# Patient Record
Sex: FEMALE | Race: WHITE | NOT HISPANIC OR LATINO | Employment: FULL TIME | URBAN - METROPOLITAN AREA
[De-identification: names, ages, dates, MRNs, and addresses within clinical notes are randomized per-mention and may not be internally consistent; named-entity substitution may affect disease eponyms.]

---

## 2024-07-07 ENCOUNTER — APPOINTMENT (OUTPATIENT)
Dept: GENERAL RADIOLOGY | Facility: CLINIC | Age: 19
End: 2024-07-07
Attending: STUDENT IN AN ORGANIZED HEALTH CARE EDUCATION/TRAINING PROGRAM

## 2024-07-07 ENCOUNTER — HOSPITAL ENCOUNTER (EMERGENCY)
Facility: CLINIC | Age: 19
Discharge: HOME | End: 2024-07-07
Attending: PHYSICIAN ASSISTANT | Admitting: PHYSICIAN ASSISTANT

## 2024-07-07 VITALS
HEART RATE: 120 BPM | DIASTOLIC BLOOD PRESSURE: 59 MMHG | BODY MASS INDEX: 31.35 KG/M2 | OXYGEN SATURATION: 96 % | HEIGHT: 64 IN | RESPIRATION RATE: 16 BRPM | WEIGHT: 183.64 LBS | SYSTOLIC BLOOD PRESSURE: 117 MMHG | TEMPERATURE: 98.3 F

## 2024-07-07 DIAGNOSIS — S60.221A CONTUSION OF RIGHT HAND, INITIAL ENCOUNTER: ICD-10-CM

## 2024-07-07 DIAGNOSIS — S60.511A ABRASION OF RIGHT HAND, INITIAL ENCOUNTER: ICD-10-CM

## 2024-07-07 PROCEDURE — 73130 X-RAY EXAM OF HAND: CPT | Mod: RT

## 2024-07-07 PROCEDURE — 99283 EMERGENCY DEPT VISIT LOW MDM: CPT

## 2024-07-07 ASSESSMENT — COLUMBIA-SUICIDE SEVERITY RATING SCALE - C-SSRS
4. HAVE YOU HAD THESE THOUGHTS AND HAD SOME INTENTION OF ACTING ON THEM?: NO
2. HAVE YOU ACTUALLY HAD ANY THOUGHTS OF KILLING YOURSELF IN THE PAST MONTH?: YES
6. HAVE YOU EVER DONE ANYTHING, STARTED TO DO ANYTHING, OR PREPARED TO DO ANYTHING TO END YOUR LIFE?: NO
5. HAVE YOU STARTED TO WORK OUT OR WORKED OUT THE DETAILS OF HOW TO KILL YOURSELF? DO YOU INTEND TO CARRY OUT THIS PLAN?: NO
1. IN THE PAST MONTH, HAVE YOU WISHED YOU WERE DEAD OR WISHED YOU COULD GO TO SLEEP AND NOT WAKE UP?: YES
3. HAVE YOU BEEN THINKING ABOUT HOW YOU MIGHT KILL YOURSELF?: NO

## 2024-07-07 ASSESSMENT — ACTIVITIES OF DAILY LIVING (ADL)
ADLS_ACUITY_SCORE: 33
ADLS_ACUITY_SCORE: 35

## 2024-07-07 NOTE — Clinical Note
Trinidad Winkler was seen and treated in our emergency department on 7/7/2024.  She may return to work on 07/10/2024.  Please excuse her from work due to injury.  She may return when improved.     If you have any questions or concerns, please don't hesitate to call.      Joseph Mcgee PA-C

## 2024-07-08 NOTE — ED PROVIDER NOTES
"  Emergency Department Note      History of Present Illness     Chief Complaint   Hand Injury      HPI   Trinidad Winkler is a 19 year old female who presents with right hand injury.  The patient punched a mirror with her right hand out of anger earlier.  Reports some bruising and discomfort to the fifth metacarpal area as well as superficial cuts from the glass.  She has not cleaned it.  Denies any thoughts of hurting herself or others right now.  No numbness or difficulty moving fingers.  Tetanus 2016.    Independent Historian   None    Review of External Notes   none    Past Medical History     Medical History and Problem List   No past medical history on file.    Medications   No current outpatient medications on file.      Surgical History   No past surgical history on file.    Physical Exam     Patient Vitals for the past 24 hrs:   BP Temp Temp src Pulse Resp SpO2 Height Weight   07/07/24 1950 117/59 98.3  F (36.8  C) Temporal 120 16 96 % 1.626 m (5' 4\") 83.3 kg (183 lb 10.3 oz)     Physical Exam  General: Alert and oriented.    Head: Normocephalic.  External ears and nose normal.    Eyes: Pupils equal and round.  Normal tracking.    Pulmonary/Chest: Effort and rate normal    MSK:  Slight bruise and ttp to dorsal head of fifth metacarpal no other bruising swelling or tenderness.  Not tender to palpation when pushed from the volar side.  Ranging all MCP and IP joints normally.  No tenderness to palpation over anatomic snuffbox or base of thenar eminence.  No pain with axial loading of the thumb.    SKIN: Numerous superficial abrasions to the dorsal hand without significant laceration no foreign bodies.  Warm and dry with strong radial pulse and cap refill <2 seconds    NEURO:  Normal strength of flexion and extension at MCP, PIP, and DIP joints.  Normal sensation to touch BL in fingers.    PSYCH:  Normal affect      Diagnostics     Imaging   XR Hand Right G/E 3 Views   Final Result   IMPRESSION: Normal joint " spaces and alignment. No fracture.            Independent Interpretation   I independently reviewed right hand x-ray no evidence of fracture or dislocation.  No radiopaque foreign body seen.    ED Course      Medications Administered   Medications - No data to display    Procedures   Procedures     Discussion of Management   None    ED Course        Optional/Additional Documentation  None    Medical Decision Making / Diagnosis     CMS Diagnoses: None    MIPS       None    MDM   Trinidad Winkler is a 19 year old female who presents after punching a mirror.  Does have a little bit of a bruise and tenderness to the fifth metacarpal however x-rays show no evidence of boxer's fracture and no other fracture or dislocation.  No volar tenderness able to range fully doubt occult fracture.  CMS is intact no evidence of tendon or neurovascular injury.  Numerous superficial abrasions no signs of foreign body no lacerations requiring repair these were cleaned and dressed.  Tetanus is up-to-date.  Discussed wound care and outpatient follow-up with Ortho in 7 to 10 days for repeat x-rays if still having pain to exclude occult fracture though again low suspicion.  Sharath discussed for home.    Disposition   The patient was discharged.     Diagnosis     ICD-10-CM    1. Contusion of right hand, initial encounter  S60.221A       2. Abrasion of right hand, initial encounter  S60.511A            Discharge Medications   There are no discharge medications for this patient.           Joseph Mcgee PA-C  07/07/24 [id number]

## 2024-08-31 ENCOUNTER — OFFICE VISIT (OUTPATIENT)
Dept: URGENT CARE | Facility: URGENT CARE | Age: 19
End: 2024-08-31

## 2024-08-31 VITALS
SYSTOLIC BLOOD PRESSURE: 121 MMHG | DIASTOLIC BLOOD PRESSURE: 86 MMHG | HEART RATE: 103 BPM | TEMPERATURE: 98.2 F | RESPIRATION RATE: 18 BRPM | WEIGHT: 191 LBS | BODY MASS INDEX: 32.79 KG/M2 | OXYGEN SATURATION: 98 %

## 2024-08-31 DIAGNOSIS — J02.9 SORE THROAT: Primary | ICD-10-CM

## 2024-08-31 LAB — DEPRECATED S PYO AG THROAT QL EIA: NEGATIVE

## 2024-08-31 PROCEDURE — 99203 OFFICE O/P NEW LOW 30 MIN: CPT | Performed by: FAMILY MEDICINE

## 2024-08-31 PROCEDURE — 87651 STREP A DNA AMP PROBE: CPT | Performed by: FAMILY MEDICINE

## 2024-08-31 ASSESSMENT — PAIN SCALES - GENERAL: PAINLEVEL: WORST PAIN (10)

## 2024-08-31 NOTE — PATIENT INSTRUCTIONS
Rapid strep test today is negative.  Strep confirmation test is in process, and we will contact you if this turns positive to get you started on antibiotics.    For now, try gargling with Chloraseptic to help numb the sore throat.  Drink plenty of fluids and use OTC medicines to help with pain as needed.

## 2024-08-31 NOTE — PROGRESS NOTES
ICD-10-CM    1. Sore throat  J02.9 Streptococcus A Rapid Screen w/Reflex to PCR - Clinic Collect     Group A Streptococcus PCR Throat Swab        RST negative.  Strep confirmation PCR pending.  No evidence of abscess formation.    PLAN:  Patient Instructions   Rapid strep test today is negative.  Strep confirmation test is in process, and we will contact you if this turns positive to get you started on antibiotics.    For now, try gargling with Chloraseptic to help numb the sore throat.  Drink plenty of fluids and use OTC medicines to help with pain as needed.    Recommended at-home COVID test if runny nose, cough, or body aches develop.    SUBJECTIVE:  Trinidad Winkler is a 19 year old female who presents to  today with sore throat x 1 day.  No known sick contacts.  Has history of enlarged tonsils.  No fevers or chills.  No runny nose or coughing.  No body aches.    OBJECTIVE:  /86 (BP Location: Right arm, Patient Position: Sitting, Cuff Size: Adult Regular)   Pulse 103   Temp 98.2  F (36.8  C) (Oral)   Resp 18   Wt 86.6 kg (191 lb)   LMP  (LMP Unknown)   SpO2 98%   BMI 32.79 kg/m    GEN: mildly ill-appearing, in NAD  ENT: TMs jr, canals normal, pharynx moderately injected, tonsils 3+ and without exudates, uvula midline  Neck: no anterior or posterior cervical LAD noted  Lungs:  CTAB  CV:  RRR, no murmur    Results for orders placed or performed in visit on 08/31/24   Streptococcus A Rapid Screen w/Reflex to PCR - Clinic Collect     Status: Normal    Specimen: Throat; Swab   Result Value Ref Range    Group A Strep antigen Negative Negative

## 2024-09-01 LAB — GROUP A STREP BY PCR: NOT DETECTED

## 2024-09-28 ENCOUNTER — HEALTH MAINTENANCE LETTER (OUTPATIENT)
Age: 19
End: 2024-09-28

## 2025-03-02 ENCOUNTER — HOSPITAL ENCOUNTER (EMERGENCY)
Facility: CLINIC | Age: 20
Discharge: HOME | End: 2025-03-02
Attending: EMERGENCY MEDICINE | Admitting: EMERGENCY MEDICINE
Payer: COMMERCIAL

## 2025-03-02 VITALS
TEMPERATURE: 98.1 F | HEART RATE: 89 BPM | DIASTOLIC BLOOD PRESSURE: 84 MMHG | RESPIRATION RATE: 18 BRPM | OXYGEN SATURATION: 99 % | SYSTOLIC BLOOD PRESSURE: 129 MMHG

## 2025-03-02 DIAGNOSIS — F33.2 SEVERE EPISODE OF RECURRENT MAJOR DEPRESSIVE DISORDER, WITHOUT PSYCHOTIC FEATURES (H): ICD-10-CM

## 2025-03-02 DIAGNOSIS — N76.0 BV (BACTERIAL VAGINOSIS): ICD-10-CM

## 2025-03-02 DIAGNOSIS — R45.851 SUICIDAL IDEATION: ICD-10-CM

## 2025-03-02 DIAGNOSIS — B96.89 BV (BACTERIAL VAGINOSIS): ICD-10-CM

## 2025-03-02 DIAGNOSIS — F10.929 ALCOHOLIC INTOXICATION WITH COMPLICATION: ICD-10-CM

## 2025-03-02 LAB
ALBUMIN SERPL BCG-MCNC: 4.4 G/DL (ref 3.5–5.2)
ALP SERPL-CCNC: 78 U/L (ref 40–150)
ALT SERPL W P-5'-P-CCNC: 30 U/L (ref 0–50)
ANION GAP SERPL CALCULATED.3IONS-SCNC: 14 MMOL/L (ref 7–15)
APAP SERPL-MCNC: <5 UG/ML (ref 10–30)
AST SERPL W P-5'-P-CCNC: 41 U/L (ref 0–45)
BASOPHILS # BLD AUTO: 0 10E3/UL (ref 0–0.2)
BASOPHILS NFR BLD AUTO: 1 %
BILIRUB SERPL-MCNC: 0.4 MG/DL
BUN SERPL-MCNC: 10.6 MG/DL (ref 6–20)
C TRACH DNA SPEC QL NAA+PROBE: NEGATIVE
CALCIUM SERPL-MCNC: 9 MG/DL (ref 8.8–10.4)
CHLORIDE SERPL-SCNC: 106 MMOL/L (ref 98–107)
CLUE CELLS: PRESENT
CREAT SERPL-MCNC: 0.79 MG/DL (ref 0.51–0.95)
EGFRCR SERPLBLD CKD-EPI 2021: >90 ML/MIN/1.73M2
EOSINOPHIL # BLD AUTO: 0.1 10E3/UL (ref 0–0.7)
EOSINOPHIL NFR BLD AUTO: 1 %
ERYTHROCYTE [DISTWIDTH] IN BLOOD BY AUTOMATED COUNT: 12.2 % (ref 10–15)
ETHANOL SERPL-MCNC: 0.29 G/DL
GLUCOSE SERPL-MCNC: 97 MG/DL (ref 70–99)
HCG SERPL QL: NEGATIVE
HCO3 SERPL-SCNC: 24 MMOL/L (ref 22–29)
HCT VFR BLD AUTO: 46.3 % (ref 35–47)
HGB BLD-MCNC: 16.1 G/DL (ref 11.7–15.7)
IMM GRANULOCYTES # BLD: 0 10E3/UL
IMM GRANULOCYTES NFR BLD: 0 %
LYMPHOCYTES # BLD AUTO: 2.5 10E3/UL (ref 0.8–5.3)
LYMPHOCYTES NFR BLD AUTO: 34 %
MCH RBC QN AUTO: 30.5 PG (ref 26.5–33)
MCHC RBC AUTO-ENTMCNC: 34.8 G/DL (ref 31.5–36.5)
MCV RBC AUTO: 88 FL (ref 78–100)
MONOCYTES # BLD AUTO: 0.7 10E3/UL (ref 0–1.3)
MONOCYTES NFR BLD AUTO: 10 %
N GONORRHOEA DNA SPEC QL NAA+PROBE: NEGATIVE
NEUTROPHILS # BLD AUTO: 4 10E3/UL (ref 1.6–8.3)
NEUTROPHILS NFR BLD AUTO: 54 %
NRBC # BLD AUTO: 0 10E3/UL
NRBC BLD AUTO-RTO: 0 /100
PLATELET # BLD AUTO: 265 10E3/UL (ref 150–450)
POTASSIUM SERPL-SCNC: 3.4 MMOL/L (ref 3.4–5.3)
PROT SERPL-MCNC: 7.5 G/DL (ref 6.4–8.3)
RBC # BLD AUTO: 5.28 10E6/UL (ref 3.8–5.2)
SALICYLATES SERPL-MCNC: <0.3 MG/DL
SODIUM SERPL-SCNC: 144 MMOL/L (ref 135–145)
SPECIMEN TYPE: NORMAL
SPECIMEN TYPE: NORMAL
TRICHOMONAS, WET PREP: ABNORMAL
WBC # BLD AUTO: 7.4 10E3/UL (ref 4–11)
WBC'S/HIGH POWER FIELD, WET PREP: ABNORMAL
YEAST, WET PREP: ABNORMAL

## 2025-03-02 PROCEDURE — 85004 AUTOMATED DIFF WBC COUNT: CPT | Performed by: EMERGENCY MEDICINE

## 2025-03-02 PROCEDURE — 80143 DRUG ASSAY ACETAMINOPHEN: CPT | Performed by: EMERGENCY MEDICINE

## 2025-03-02 PROCEDURE — 36415 COLL VENOUS BLD VENIPUNCTURE: CPT | Performed by: EMERGENCY MEDICINE

## 2025-03-02 PROCEDURE — 82077 ASSAY SPEC XCP UR&BREATH IA: CPT | Performed by: EMERGENCY MEDICINE

## 2025-03-02 PROCEDURE — 250N000013 HC RX MED GY IP 250 OP 250 PS 637: Performed by: EMERGENCY MEDICINE

## 2025-03-02 PROCEDURE — 84703 CHORIONIC GONADOTROPIN ASSAY: CPT | Performed by: EMERGENCY MEDICINE

## 2025-03-02 PROCEDURE — 80179 DRUG ASSAY SALICYLATE: CPT | Performed by: EMERGENCY MEDICINE

## 2025-03-02 PROCEDURE — 82565 ASSAY OF CREATININE: CPT | Performed by: EMERGENCY MEDICINE

## 2025-03-02 PROCEDURE — 99285 EMERGENCY DEPT VISIT HI MDM: CPT

## 2025-03-02 PROCEDURE — 87210 SMEAR WET MOUNT SALINE/INK: CPT | Performed by: EMERGENCY MEDICINE

## 2025-03-02 PROCEDURE — 84295 ASSAY OF SERUM SODIUM: CPT | Performed by: EMERGENCY MEDICINE

## 2025-03-02 PROCEDURE — 87491 CHLMYD TRACH DNA AMP PROBE: CPT | Performed by: EMERGENCY MEDICINE

## 2025-03-02 PROCEDURE — 87591 N.GONORRHOEAE DNA AMP PROB: CPT | Performed by: EMERGENCY MEDICINE

## 2025-03-02 RX ORDER — GABAPENTIN 300 MG/1
300 CAPSULE ORAL 3 TIMES DAILY
COMMUNITY
End: 2025-03-02

## 2025-03-02 RX ORDER — METRONIDAZOLE 500 MG/1
500 TABLET ORAL 2 TIMES DAILY
Qty: 14 TABLET | Refills: 0 | Status: SHIPPED | OUTPATIENT
Start: 2025-03-02 | End: 2025-03-09

## 2025-03-02 RX ORDER — HYDROXYZINE HYDROCHLORIDE 25 MG/1
25 TABLET, FILM COATED ORAL 3 TIMES DAILY PRN
COMMUNITY
End: 2025-03-02

## 2025-03-02 RX ORDER — DEXTROAMPHETAMINE SACCHARATE, AMPHETAMINE ASPARTATE MONOHYDRATE, DEXTROAMPHETAMINE SULFATE AND AMPHETAMINE SULFATE 3.75; 3.75; 3.75; 3.75 MG/1; MG/1; MG/1; MG/1
15 CAPSULE, EXTENDED RELEASE ORAL DAILY
COMMUNITY

## 2025-03-02 RX ADMIN — NICOTINE POLACRILEX 2 MG: 2 GUM, CHEWING ORAL at 08:25

## 2025-03-02 RX ADMIN — NICOTINE POLACRILEX 2 MG: 2 GUM, CHEWING ORAL at 09:28

## 2025-03-02 RX ADMIN — NICOTINE POLACRILEX 2 MG: 2 GUM, CHEWING ORAL at 07:18

## 2025-03-02 ASSESSMENT — ACTIVITIES OF DAILY LIVING (ADL)
ADLS_ACUITY_SCORE: 41

## 2025-03-02 ASSESSMENT — COLUMBIA-SUICIDE SEVERITY RATING SCALE - C-SSRS
1. IN THE PAST MONTH, HAVE YOU WISHED YOU WERE DEAD OR WISHED YOU COULD GO TO SLEEP AND NOT WAKE UP?: YES
6. HAVE YOU EVER DONE ANYTHING, STARTED TO DO ANYTHING, OR PREPARED TO DO ANYTHING TO END YOUR LIFE?: YES
5. HAVE YOU STARTED TO WORK OUT OR WORKED OUT THE DETAILS OF HOW TO KILL YOURSELF? DO YOU INTEND TO CARRY OUT THIS PLAN?: YES
IS THE PATIENT NOT ABLE TO COMPLETE C-SSRS: REFUSES TO ANSWER
3. HAVE YOU BEEN THINKING ABOUT HOW YOU MIGHT KILL YOURSELF?: YES
2. HAVE YOU ACTUALLY HAD ANY THOUGHTS OF KILLING YOURSELF IN THE PAST MONTH?: YES

## 2025-03-02 NOTE — ED PROVIDER NOTES
I assumed care for this patient after signout from my colleague.  Plan at time of signout was to have DEC assessed the patient when clinically sober and follow-up on the vaginal swabs.  During my shift she becomes clinically sober and DEC evaluates the patient.  Patient is able to safety plan and feel safe going home. She has an appointment with her psychiatrist tomorrow.  Gonorrhea and Chlamydia swabs are still in process, but the wet prep is positive for BV.  She is given a prescription for Flagyl.  She feels comfortable going home at this time.  Return precautions are given and she verbalizes understanding.  She is discharged home in stable condition.    Ranjit Coker MD on 3/2/2025 at 10:00 AM       Ranjit Coker MD  03/02/25 1000

## 2025-03-02 NOTE — CONSULTS
"Diagnostic Evaluation Consultation  Crisis Assessment    Patient Name: Trinidad Winkler  Age:  20 year old  Legal Sex: female  Gender Identity: female  Pronouns:   Race: White  Ethnicity: Not  or   Language: English      Patient was assessed: Virtual: [place]   Crisis Assessment Start Date: 03/02/25  Crisis Assessment Start Time: 0909  Crisis Assessment Stop Time: 0926  Patient location: [facility] Emergency Dept                                 Referral Data and Chief Complaint  Trinidad Winkler presents to the ED via police. Patient is presenting to the ED for the following concerns: Substance use, Depression. Factors that make the mental health crisis life threatening or complex are: Pt arrives following welfare check initiated by a friend. Reportedly pt had been drinking etoh (acknowledges hx of etoh abuse) at home with a friend. Pt acknowledges she \"got into a weird mood\". 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 Pt has a hx of Bipolar disorder and has been without her prescribed medication for approx 2 mos due to issues with health insurance. This is likely another contributing factor. Pt acknowledges an impuslive statement of overdosing on her prescribed medication and this likely caused concern for her friend. Pt adamantly denies intent to act. \"I would never kill myself, I do not want to hurt my dad and grandpa\". Engaged in discussion re lethal means safety - pt does NOT have access to medication as she has run out. Pt does not have a hx of suicide attempts. She recognizes that etoh is a depressant and has a pattern of exacerbating her already low mood..      Informed Consent and Assessment Methods  Explained the crisis assessment process, including applicable information disclosures and limits to confidentiality, assessed understanding of " the process, and obtained consent to proceed with the assessment.  Assessment methods included conducting a formal interview with patient, review of medical records, collaboration with medical staff, and obtaining relevant collateral information from family and community providers when available.  : done     History of the Crisis   Hx of bipolar disorder - followed as an OP for psychiatry ([facility]).    Brief Psychosocial History  Family:  Single, Children no  Support System:  Parent(s), Grandparent(s)  Employment Status:  unemployed  Source of Income:  unable to assess  Financial Environmental Concerns:  none  Current Hobbies:  family functions, interaction with pets, television/movies/videos  Barriers in Personal Life:  mental health concerns    Significant Clinical History  Current Anxiety Symptoms:     Current Depression/Trauma:  impaired decision making, sadness  Current Somatic Symptoms:     Current Psychosis/Thought Disturbance:     Current Eating Symptoms:     Chemical Use History:  Alcohol: Binge  Last Use:: 03/02/25  Benzodiazepines: None  Opiates: None  Last Use:: 02/26/25  Marijuana: None  Other Use: None   Past diagnosis:  Bipolar Disorder, Substance Use Disorder  Family history:  Substance Use Disorder, Bipolar Disorder  Past treatment:  Individual therapy, Psychiatric Medication Management  Details of most recent treatment:  Followed as an OP for psychiatry ([facility])  Other relevant history:  Hx of individual therapy - tried a handful of providers, did not identify it to be helpful. No hx of IP  admissions.    Have there been any medication changes in the past two weeks:   (pt has been without her psychiatric medication (Fluoxetine, Gabapentin, Trazadone) for approx 2 mos due to issues with health insurance.)       Is the patient compliant with medications:  no  pt has been without her psychiatric medication (Fluoxetine, Gabapentin, Trazadone) for approx 2 mos due to issues with health  insurance.     Collateral Information  Is there collateral information: No        Risk Assessment  Leslie Suicide Severity Rating Scale Full Clinical Version:  Suicidal Ideation  Q6 Suicide Behavior (Lifetime): yes        Leslie Suicide Severity Rating Scale Recent:   Suicidal Ideation (Recent)  Q1 Wished to be Dead (Past Month): no  Q2 Suicidal Thoughts (Past Month): no  Q3 Suicidal Thought Method: yes  Q5 Suicide Intent with Specific Plan: yes  Level of Risk per Screen: no risks indicated          Environmental or Psychosocial Events: loss of a relationship due to divorce/separation, unemployment/underemployment, ongoing abuse of substances  Protective Factors: Protective Factors: strong bond to family unit, community support, or employment, responsibilities and duties to others, including pets and children, lives in a responsibly safe and stable environment, able to access care without barriers, sense of belonging, reality testing ability    Does the patient have thoughts of harming others? Feels Like Hurting Others: no  Previous Attempt to Hurt Others: no  Is the patient engaging in sexually inappropriate behavior?: no  Does Patient have a known history of aggressive behavior: No    Is the patient engaging in sexually inappropriate behavior?  no        Mental Status Exam   Affect: Appropriate  Appearance: Appropriate  Attention Span/Concentration: Attentive  Eye Contact: Engaged    Fund of Knowledge: Appropriate   Language /Speech Content: Fluent  Language /Speech Volume: Normal  Language /Speech Rate/Productions: Normal  Recent Memory: Intact  Remote Memory: Intact  Mood: Normal  Orientation to Person: Yes   Orientation to Place: Yes  Orientation to Time of Day: Yes  Orientation to Date: Yes     Situation (Do they understand why they are here?): Yes  Psychomotor Behavior: Normal  Thought Content: Clear  Thought Form: Intact     Mini-Cog Assessment  Number of Words Recalled:    Clock-Drawing Test:     Three  Item Recall:    Mini-Cog Total Score:       Medication  Psychotropic medications:   Medication Orders - Psychiatric (From admission, onward)      Start     Dose/Rate Route Frequency Ordered Stop    03/02/25 0716  nicotine (NICORETTE) gum 2 mg         2 mg Buccal EVERY 1 HOUR PRN 03/02/25 0716               Current Care Team  Patient Care Team:  No Ref-Primary, Physician as PCP - General    Diagnosis    F31.9 Unspecified Bipolar Disorder  F10.120 Alcohol Abuse, with intox, uncomplicated    Clinical Summary and Substantiation of Recommendations   Clinical Substantiation:  No acute safety concerns assessed. Pt calm, cooperative, engaged in assessment. No signs of impairment. It is recommended pt decrease use of etoh as her use has exacerbated MH sx. It is recommended that pt attend appt with est psychiatrist tomorrow 3/3. She states that her dad has agreed to assist with problem solving the issues with health insurance so she can get restarted on her psychiatric medication regiment. Pt names significant support from her dad and grandpa and visits with them 3-4x/week. Pt has 2 cats and enjoys spending time with them as they bring a sense of calm. Pt denies additional resources at this time. She engages in safety planning w/ this writer and is encouraged to return to the ED should sx worsen/change. All in agreement.    Goals for crisis stabilization:  sober from etoh - safety planning    Next steps for Care Team:  d/c    Treatment Objectives Addressed:  rapport building, processing feelings, safety planning, identifying an appropriate aftercare plan, assessing safety, Lethal means counseling, identifying additional supports    Therapeutic Interventions:  Engaged in safety planning, Reviewed healthy living that supports positive mental health, including looking at sleep hygiene, regular movement, nutrition, and regular socialization., Provided positive reinforcement for progress towards goals, gains in knowledge, and  application of skills previously taught., Explored motivation for behavioral change.    Has a specific means been identified for suicidal/homicide actions: Yes    If yes, describe:  impulsive statement re overdose on prescribed medication    Explain action steps toward mitigation:  pt has no prescribed medication at her home - has been without it for the past approx 2 mos due to issues with health insurance.    Document completion of mitigation actions:  discussion related to lethal means safety -pt has no prescribed medication at her home - has been without it for the past approx 2 mos due to issues with health insurance.    The follow up action still needed prior to discharge:   N/A - complete.    Patient coping skills attempted to reduce the crisis:  ED    Disposition  Recommended referrals: Medication Management        Reviewed case and recommendations with attending provider. Attending Name: John Paul GOMES       Attending concurs with disposition: yes       Patient and/or validated legal guardian concurs with disposition:   yes       Final disposition:  discharge     Legal status:  vol                             Assessment Details   Total duration spent with the patient: 16 min     CPT code(s) utilized: 44153 - Psychotherapy (with patient) - 30 (16-37*) min    Michelle Bernabe [facility], Psychotherapist  DEC - Triage & Transition Services  Callback: [phone]

## 2025-03-02 NOTE — ED NOTES
Received DEC Consult for pt. Confirmed cart number. ETA provided. Will update unit if pt will be seen sooner. Unit should call a DEC Coordinator if there are updates or any changes.     0840- provided new ETA of 0900. Nurse states pt is ready for consult.       ALIYAH Berkowitz  /DEC  [phone]

## 2025-03-02 NOTE — DISCHARGE INSTRUCTIONS
Keep your appointment with your psychiatrist tomorrow.  You have bacterial vaginosis which can be treated with antibiotics.  Do not drink alcohol while taking this antibiotic.  Please return the emergency department if you develop any new or concerning symptoms.

## 2025-03-02 NOTE — PLAN OF CARE
Trinidad Winkler  March 2, 2025  Plan of Care Hand-off Note     Patient Recommended Care Path: discharge    Clinical Substantiation:  No acute safety concerns assessed. Pt calm, cooperative, engaged in assessment. No signs of impairment. It is recommended pt decrease use of etoh as her use has exacerbated MH sx. It is recommended that pt attend appt with est psychiatrist tomorrow 3/3. She states that her dad has agreed to assist with problem solving the issues with health insurance so she can get restarted on her psychiatric medication regiment. Pt names significant support from her dad and grandpa and visits with them 3-4x/week. Pt has 2 cats and enjoys spending time with them as they bring a sense of calm. Pt denies additional resources at this time. She engages in safety planning w/ this writer and is encouraged to return to the ED should sx worsen/change. All in agreement.    Goals for crisis stabilization:  sober from etoh - safety planning    Next steps for Care Team:  d/c    Treatment Objectives Addressed:  rapport building, processing feelings, safety planning, identifying an appropriate aftercare plan, assessing safety, Lethal means counseling, identifying additional supports    Therapeutic Interventions:  Engaged in safety planning, Reviewed healthy living that supports positive mental health, including looking at sleep hygiene, regular movement, nutrition, and regular socialization., Provided positive reinforcement for progress towards goals, gains in knowledge, and application of skills previously taught., Explored motivation for behavioral change.    Has a specific means been identified for suicidal.homicide actions: Yes  If yes, describe: impulsive statement re overdose on prescribed medication  Explain action steps toward mitigation: pt has no prescribed medication at her home - has been without it for the past approx 2 mos due to issues with health insurance.  Document completion of mitigation action:  discussion related to lethal means safety -pt has no prescribed medication at her home - has been without it for the past approx 2 mos due to issues with health insurance.  The follow up action still needed prior to discharge:  N/A - complete.    Patient coping skills attempted to reduce the crisis:  ED      Collateral contact information:   N/A    Legal Status:  vol                              Psychiatry Consult: no, pt d/c    Michelle Bernabe, [facility]

## 2025-03-02 NOTE — ED TRIAGE NOTES
Pt comes from home at her apartment in [place] Pt admits to drinking a bottle of vodka and used cocaine 2 days ago. Patient states she has a history of depression for last ten years, not taking medication at this time and states mental health professional is on maternity leave right now. Patient has suicidal ideation and states she has a plan to take a whole bottle of gabapentin. Denies taking any medication tonight.      Triage Assessment (Adult)       Row Name 03/02/25 0651          Triage Assessment    Airway WDL WDL        Respiratory WDL    Respiratory WDL WDL        Skin Circulation/Temperature WDL    Skin Circulation/Temperature WDL WDL        Cardiac WDL    Cardiac WDL WDL        Peripheral/Neurovascular WDL    Peripheral Neurovascular WDL WDL        Cognitive/Neuro/Behavioral WDL    Cognitive/Neuro/Behavioral WDL all     Level of Consciousness intermittent confusion;alert     Arousal Level opens eyes spontaneously     Orientation person;place;situation     Speech clear;spontaneous     Mood/Behavior restless;cooperative        Pupils (CN II)    Pupil PERRLA yes     Pupil Size Left 3 mm     Pupil Size Right 3 mm        Jessica Coma Scale    Best Eye Response 4-->(E4) spontaneous     Best Motor Response 6-->(M6) obeys commands     Best Verbal Response 5-->(V5) oriented     Jessica Coma Scale Score 15

## 2025-03-02 NOTE — ED PROVIDER NOTES
History     Chief Complaint:  Suicidal    HPI   Trinidad Winkler is a 20 year old female brought in by EMS.  She lives in an apartment in [place].  She struggles with alcohol abuse and ongoing depression.  History of self cutting but no recent cutting.  She has been considering suicide with plan to overdose on medication including her Prozac and gabapentin.  She does see a psychiatrist but states he has been on vacation.  She drank a large amount of alcohol last evening.  She was texting and speaking with friends who called EMS and patient was brought in on a safety hold.  Of note patient states that she has not been taking any of her prescribed medications for some time.  Patient states she used cocaine 3 days ago but denies other for more recent drug use.    Patient also requested testing for STDs.  She has a history of chlamydia which was treated.  She denies any vaginal discharge but does say there is slight itching sensation.  She is comfortable with performing a self swab.    Independent Historian:    Patient provides history above  EMS report at bedside    Review of External Notes:  Care Everywhere reviewed in epic updated    Medications: Not currently taking  amphetamine-dextroamphetamine (ADDERALL XR) 15 MG 24 hr capsule  FLUoxetine (PROZAC) 20 MG capsule  gabapentin (NEURONTIN) 300 MG capsule  hydrOXYzine HCl (ATARAX) 25 MG tablet      Past Medical History:    Past Medical History:   Diagnosis Date    ADHD (attention deficit hyperactivity disorder)     Alcohol abuse     Anxiety     Bipolar disorder     Cocaine abuse     Deliberate self-cutting     Depressive disorder      Past Surgical History:    No past surgical history on file.       Physical Exam   Patient Vitals for the past 24 hrs:   BP Temp Temp src Pulse Resp SpO2   03/02/25 0650 132/81 98.1  F (36.7  C) Oral 110 17 100 %     Physical Exam  Nursing note and vitals reviewed.  Constitutional: Cooperative.  Sitting in a chair in ED room 4.  Poor  eye contact.  Words consistent with alcohol intoxication  Cardiovascular: Normal rate, regular rhythm and normal heart sounds.  No murmur.  Pulmonary/Chest: Effort normal and breath sounds normal. No respiratory distress.    Abdominal: Soft.  Normal appearance.  Nontender  Neurological: Alert.  Oriented x 3  Skin: Skin is warm and dry.   Psychiatric: Depressed mood and affect.  Positive suicidal ideation.  No hallucinations or apparent response to internal stimuli.    Emergency Department Course     Imaging:  No orders to display     Laboratory:  CMP, CBC, alcohol/Tylenol/salicylate levels, urine drug screen, serum pregnancy test: Ordered and pending  Wet prep, gonorrhea/chlamydia: Pending    Interventions:  Medications - No data to display     Assessments:  0625: Patient evaluated in ED room 4.  HERIBERTO placed after exam and history.    Independent Interpretation (X-rays, CTs, rhythm strip):  None    Consultations/Discussion of Management or Tests:  DEC mental health consultation: Pending     Social Drivers of Health affecting care:  Stress/Adjustment Disorders     Disposition:  Patient signed out to oncoming ED physician, 0700 at shift change pending laboratory evaluation and DEC consultation when sober    Impression & Plan       Medical Decision Making:  This is a 20-year-old female who presents with suicidal thoughts in the setting of alcohol intoxication.  She has a plan to ingest medication that she has stockpiled at home.  She is been off her medication and currently her psychiatrist is out of town.  Her friends called EMS prompting her arrival here.  She has been calm and cooperative with no signs of psychosis or agitation.  We will perform toxicologic metabolic assessment to ensure there is no life-threatening ingestion present.  No signs of trauma.  DEC evaluation when clinically sober.  Patient signed out to oncoming ED physician pending repeat evaluation and follow-up of laboratory testing    Diagnosis:     ICD-10-CM    1. Alcoholic intoxication with complication  F10.929       2. Severe episode of recurrent major depressive disorder, without psychotic features (H)  F33.2       3. Suicidal ideation  R45.851             Jatinder Quan MD  03/02/25 0654

## 2025-03-02 NOTE — PHARMACY-ADMISSION MEDICATION HISTORY
Pharmacist Admission Medication History    Admission medication history is complete. The information provided in this note is only as accurate as the sources available at the time of the update.    Information Source(s): Patient via in-person    Pertinent Information: Pt states she is only taking Adderall currently. Pt states she last took fluoxetine about 2 months ago and that she had taken her mom's gabapentin and hydroxyzine 2-3 weeks ago, but this was not prescribed to her.    Changes made to PTA medication list:  Added: None  Deleted: gabapentin, hydroxyzine  Changed: None    Allergies reviewed with patient and updates made in EHR: yes    Medication History Completed By: Dora Karimi RPH 3/2/2025 8:48 AM    PTA Med List   Medication Sig Last Dose/Taking    amphetamine-dextroamphetamine (ADDERALL XR) 15 MG 24 hr capsule Take 15 mg by mouth daily. Past Week Morning    FLUoxetine (PROZAC) 20 MG capsule Take 20 mg by mouth daily. More than a month